# Patient Record
Sex: FEMALE | HISPANIC OR LATINO | Employment: FULL TIME | URBAN - METROPOLITAN AREA
[De-identification: names, ages, dates, MRNs, and addresses within clinical notes are randomized per-mention and may not be internally consistent; named-entity substitution may affect disease eponyms.]

---

## 2020-09-07 ENCOUNTER — APPOINTMENT (OUTPATIENT)
Dept: CT IMAGING | Facility: CLINIC | Age: 45
End: 2020-09-07
Attending: EMERGENCY MEDICINE

## 2020-09-07 ENCOUNTER — APPOINTMENT (OUTPATIENT)
Dept: ULTRASOUND IMAGING | Facility: CLINIC | Age: 45
End: 2020-09-07
Attending: EMERGENCY MEDICINE

## 2020-09-07 ENCOUNTER — HOSPITAL ENCOUNTER (EMERGENCY)
Facility: CLINIC | Age: 45
Discharge: HOME | End: 2020-09-07
Attending: EMERGENCY MEDICINE | Admitting: EMERGENCY MEDICINE

## 2020-09-07 VITALS
OXYGEN SATURATION: 98 % | SYSTOLIC BLOOD PRESSURE: 126 MMHG | HEART RATE: 78 BPM | TEMPERATURE: 98.3 F | RESPIRATION RATE: 16 BRPM | DIASTOLIC BLOOD PRESSURE: 97 MMHG | WEIGHT: 140 LBS

## 2020-09-07 DIAGNOSIS — D25.2 SUBSEROUS LEIOMYOMA OF UTERUS: ICD-10-CM

## 2020-09-07 DIAGNOSIS — R55 SYNCOPE, UNSPECIFIED SYNCOPE TYPE: ICD-10-CM

## 2020-09-07 DIAGNOSIS — R56.9 SEIZURE-LIKE ACTIVITY (H): ICD-10-CM

## 2020-09-07 LAB
ANION GAP SERPL CALCULATED.3IONS-SCNC: 6 MMOL/L (ref 3–14)
B-HCG FREE SERPL-ACNC: <5 IU/L
B-HCG SERPL-ACNC: <1 IU/L (ref 0–5)
BASOPHILS # BLD AUTO: 0 10E9/L (ref 0–0.2)
BASOPHILS NFR BLD AUTO: 0.6 %
BUN SERPL-MCNC: 15 MG/DL (ref 7–30)
CALCIUM SERPL-MCNC: 9.2 MG/DL (ref 8.5–10.1)
CHLORIDE SERPL-SCNC: 104 MMOL/L (ref 94–109)
CO2 SERPL-SCNC: 26 MMOL/L (ref 20–32)
CREAT SERPL-MCNC: 0.67 MG/DL (ref 0.52–1.04)
D DIMER PPP FEU-MCNC: 0.5 UG/ML FEU (ref 0–0.5)
DIFFERENTIAL METHOD BLD: NORMAL
EOSINOPHIL # BLD AUTO: 0.1 10E9/L (ref 0–0.7)
EOSINOPHIL NFR BLD AUTO: 1.6 %
ERYTHROCYTE [DISTWIDTH] IN BLOOD BY AUTOMATED COUNT: 12.9 % (ref 10–15)
GFR SERPL CREATININE-BSD FRML MDRD: >90 ML/MIN/{1.73_M2}
GLUCOSE SERPL-MCNC: 114 MG/DL (ref 70–99)
HCT VFR BLD AUTO: 44.6 % (ref 35–47)
HGB BLD-MCNC: 14.1 G/DL (ref 11.7–15.7)
IMM GRANULOCYTES # BLD: 0 10E9/L (ref 0–0.4)
IMM GRANULOCYTES NFR BLD: 0.4 %
LYMPHOCYTES # BLD AUTO: 1.3 10E9/L (ref 0.8–5.3)
LYMPHOCYTES NFR BLD AUTO: 26.7 %
MCH RBC QN AUTO: 29.1 PG (ref 26.5–33)
MCHC RBC AUTO-ENTMCNC: 31.6 G/DL (ref 31.5–36.5)
MCV RBC AUTO: 92 FL (ref 78–100)
MONOCYTES # BLD AUTO: 0.4 10E9/L (ref 0–1.3)
MONOCYTES NFR BLD AUTO: 7.6 %
NEUTROPHILS # BLD AUTO: 3.1 10E9/L (ref 1.6–8.3)
NEUTROPHILS NFR BLD AUTO: 63.1 %
NRBC # BLD AUTO: 0 10*3/UL
NRBC BLD AUTO-RTO: 0 /100
PLATELET # BLD AUTO: 246 10E9/L (ref 150–450)
POTASSIUM SERPL-SCNC: 3.6 MMOL/L (ref 3.4–5.3)
RBC # BLD AUTO: 4.85 10E12/L (ref 3.8–5.2)
SODIUM SERPL-SCNC: 136 MMOL/L (ref 133–144)
TROPONIN I SERPL-MCNC: <0.015 UG/L (ref 0–0.04)
WBC # BLD AUTO: 4.9 10E9/L (ref 4–11)

## 2020-09-07 PROCEDURE — 25000132 ZZH RX MED GY IP 250 OP 250 PS 637: Performed by: EMERGENCY MEDICINE

## 2020-09-07 PROCEDURE — 85025 COMPLETE CBC W/AUTO DIFF WBC: CPT | Performed by: EMERGENCY MEDICINE

## 2020-09-07 PROCEDURE — 71275 CT ANGIOGRAPHY CHEST: CPT

## 2020-09-07 PROCEDURE — 84702 CHORIONIC GONADOTROPIN TEST: CPT

## 2020-09-07 PROCEDURE — 80048 BASIC METABOLIC PNL TOTAL CA: CPT | Performed by: EMERGENCY MEDICINE

## 2020-09-07 PROCEDURE — 99285 EMERGENCY DEPT VISIT HI MDM: CPT | Mod: 25

## 2020-09-07 PROCEDURE — 70450 CT HEAD/BRAIN W/O DYE: CPT

## 2020-09-07 PROCEDURE — 76856 US EXAM PELVIC COMPLETE: CPT

## 2020-09-07 PROCEDURE — 84702 CHORIONIC GONADOTROPIN TEST: CPT | Performed by: EMERGENCY MEDICINE

## 2020-09-07 PROCEDURE — 85379 FIBRIN DEGRADATION QUANT: CPT | Performed by: EMERGENCY MEDICINE

## 2020-09-07 PROCEDURE — 25000128 H RX IP 250 OP 636: Performed by: EMERGENCY MEDICINE

## 2020-09-07 PROCEDURE — 84484 ASSAY OF TROPONIN QUANT: CPT | Performed by: EMERGENCY MEDICINE

## 2020-09-07 PROCEDURE — 25800030 ZZH RX IP 258 OP 636: Performed by: EMERGENCY MEDICINE

## 2020-09-07 PROCEDURE — 93005 ELECTROCARDIOGRAM TRACING: CPT

## 2020-09-07 PROCEDURE — 96360 HYDRATION IV INFUSION INIT: CPT

## 2020-09-07 RX ORDER — IOPAMIDOL 755 MG/ML
500 INJECTION, SOLUTION INTRAVASCULAR ONCE
Status: COMPLETED | OUTPATIENT
Start: 2020-09-07 | End: 2020-09-07

## 2020-09-07 RX ORDER — ACETAMINOPHEN 500 MG
1000 TABLET ORAL ONCE
Status: COMPLETED | OUTPATIENT
Start: 2020-09-07 | End: 2020-09-07

## 2020-09-07 RX ORDER — IBUPROFEN 600 MG/1
600 TABLET, FILM COATED ORAL ONCE
Status: COMPLETED | OUTPATIENT
Start: 2020-09-07 | End: 2020-09-07

## 2020-09-07 RX ADMIN — SODIUM CHLORIDE 75 ML: 9 INJECTION, SOLUTION INTRAVENOUS at 14:29

## 2020-09-07 RX ADMIN — IOPAMIDOL 54 ML: 755 INJECTION, SOLUTION INTRAVENOUS at 14:29

## 2020-09-07 RX ADMIN — ACETAMINOPHEN 1000 MG: 500 TABLET, FILM COATED ORAL at 13:20

## 2020-09-07 RX ADMIN — IBUPROFEN 600 MG: 600 TABLET, FILM COATED ORAL at 15:13

## 2020-09-07 ASSESSMENT — ENCOUNTER SYMPTOMS
FATIGUE: 1
FEVER: 0
SLEEP DISTURBANCE: 1
DIZZINESS: 0
SHORTNESS OF BREATH: 1
HEADACHES: 1
BACK PAIN: 0
TREMORS: 1
ABDOMINAL PAIN: 0
NECK PAIN: 0

## 2020-09-07 NOTE — ED AVS SNAPSHOT
[facility] Emergency Department  [address]  [facility] [address]  Phone:  [phone]  Fax:  [phone]                                    Cleopatra Choi   MRN: [id number]    Department:  [facility] Emergency Department   Date of Visit:  9/7/2020           After Visit Summary Signature Page    I have received my discharge instructions, and my questions have been answered. I have discussed any challenges I see with this plan with the nurse or doctor.    ..........................................................................................................................................  Patient/Patient Representative Signature      ..........................................................................................................................................  Patient Representative Print Name and Relationship to Patient    ..................................................               ................................................  Date                                   Time    ..........................................................................................................................................  Reviewed by Signature/Title    ...................................................              ..............................................  Date                                               Time          22EPIC Rev 08/18

## 2020-09-07 NOTE — DISCHARGE INSTRUCTIONS
Please monitor your symptoms closely.  Please follow-up with your primary care provider in 2 to 3 days for reevaluation.  I would also like you to follow-up with neurology.  Please contact the clinic tomorrow morning.  We have also faxed records over to them from the ER to assist with follow-up.    Please do not drive or be alone your spending bodies of water until you are cleared to do so by neurology.    Discharge Instructions  Syncope    Syncope (fainting) is a sudden, short loss of consciousness (passing out spell). People will usually fall to the ground when they faint or slump over if seated.  People may also shake when this happens, and it can sometimes be difficult to tell the difference between syncope and a seizure. At this time, your provider does not find a reason to suspect that your fainting spell is a sign of anything dangerous or life-threatening.  However, sometimes the signs of serious illness do not show up right away.     Generally, every Emergency Department visit should have a follow-up clinic visit with either a primary or a specialty clinic/provider. Please follow-up as instructed by your emergency provider today.    Return to the Emergency Department if:  You faint again.   You have any significant bleeding.  You have chest pain or a fast or irregular heartbeat.  You feel short of breath.  You cough up any blood.  You have abdominal (belly) pain or unusual back pain.  You have ongoing vomiting (throwing up) or diarrhea (loose stools).  You have a black or tarry bowel movement, or blood in the stool or in your vomit.  You have a fever over 101 F.  You lose feeling or cannot move a part of your body or cannot talk normally.  You are confused, have a headache, cannot see well, or have a seizure.  DO NOT DRIVE. CALL 911 INSTEAD!    What can I do to help myself?  Follow any specific instructions that your provider discussed with you.  If you feel light-headed, make sure to sit down right away,  even if you have to sit on the floor.  Follow up with your regular medical provider as discussed for further management. This may include lowering your blood pressure medications, insulin or other diabetic medications, checking your blood sugar more frequently, and drinking more fluids, taking medicines for vomiting or diarrhea or getting up slower.  If you were given a prescription for medicine here today, be sure to read all of the information (including the package insert) that comes with your prescription.  This will include important information about the medicine, its side effects, and any warnings that you need to know about.  The pharmacist who fills the prescription can provide more information and answer questions you may have about the medicine.  If you have questions or concerns that the pharmacist cannot address, please call or return to the Emergency Department.   Remember that you can always come back to the Emergency Department if you are not able to see your regular provider in the amount of time listed above, if you get any new symptoms, or if there is anything that worries you.

## 2020-09-07 NOTE — ED PROVIDER NOTES
"  History   Chief Complaint:  Loss of Consciousness    HPI  Cleopatra Choi is a 45 year old female who presents via EMS after losing consciousness and sustaining a head injury. Last night, the patient reports having a sleepless night. When she does not sleep well, she reports it is common for her to have a headache upon waking. This morning was no different, and she reports her headache was her typical headache. She denies any other symptoms this morning, other than fatigue. Upon her arrival at work, around 0645, she took a dose of OTC analgesia for the headache with improvement and continued about her typical work day until 1030. At that time, she was sitting at her desk, in a high chair, when she suddenly became short of breath and her headache slightly worsened before losing consciousness; this caused her to fall off the chair and hit her forehead on the floor. One of her coworkers, a nurse, witnessed the event and reported the patient was \"shaking\" for about 30 seconds before regaining consciousness. Upon awakening, however, the patient reports she was alert and oriented, and remembers waking up on the floor. She did not lose control of her bowel or bladder, but does report a laceration to her upper lip. Given the syncopal event, her coworker called 911. The paramedics placed her in a C-collar and brought her to the ED. The patient denies any prodromal chest pain, palpitations, unilateral weakness/numbness, dizziness, or severe abdominal pain. Currently, she reports a mild headache. She denies any dental pain or jaw malocclusion. She has no personal history of syncope or seizures, however she reports her father was diagnosed with epilepsy at age 50. She also denies any personal or family history of blood clots, and she is not currently on hormone therapy. She does report a long car trip, from [place] to [place], about two weeks ago. Around that time, she reports her legs seemed more swollen, however this was " "equal bilaterally. She denies any current calf pain or swelling.     Additionally, the patient reports concern for a possible miscarriage. She states her last menstrual period was 8/16 however in the weeks after that she started to \"feel pregnant\" however she did not take a pregnancy test. This week, she started noticing vaginal spotting which feels similar to previous miscarriages. Currently, she reports the bleeding is still present, but is not brisk and she does not need to wear a pad/tampon. She also denies any abdominal pain currently. In addition to miscarriages, she reports a history of two ectopic pregnancies both of which were treated with medication, not surgical intervention. In total, she has had approximately 5 pregnancies and only one child.     Allergies:  No Known Allergies    Medications:    The patient is currently on no regular medications.     Past Medical History:    Ectopic pregnancy   Miscarriage    Past Surgical History:    Abdominoplasty     Family History:    MI - Father, first at 50   Epilepsy - Father     Social History:  Marital Status: .  Presents with EMS. , Wilber, at bedside.   Works at [employer].   Event occurred at work.     Review of Systems   Constitutional: Positive for fatigue. Negative for fever.   Respiratory: Positive for shortness of breath (pre-syncope).    Cardiovascular: Negative for chest pain.   Gastrointestinal: Negative for abdominal pain.   Genitourinary: Positive for vaginal bleeding.   Musculoskeletal: Negative for back pain and neck pain.   Neurological: Positive for tremors, syncope and headaches. Negative for dizziness.   Psychiatric/Behavioral: Positive for sleep disturbance.   All other systems reviewed and are negative.    Physical Exam     Patient Vitals for the past 24 hrs:   BP Temp Temp src Pulse Resp SpO2 Weight   09/07/20 1325 -- -- -- -- -- -- 63.5 kg (140 lb)   09/07/20 1230 127/77 -- -- 77 -- 98 % --   09/07/20 1215 129/73 -- -- " 74 -- 100 % --   09/07/20 1200 130/83 -- -- 80 -- 98 % --   09/07/20 1145 -- -- -- 82 -- 100 % --   09/07/20 1130 -- -- -- 80 -- 97 % --   09/07/20 1117 (!) 139/94 98.3  F (36.8  C) Oral 86 16 98 % --        Physical Exam  General:              Well-nourished              Speaking in full sentences              GCS 15              Awake, alert  Head:              Hematoma to forehead              Remainder of scalp without hematoma or step-off              No bleeding  Eyes:              Conjunctiva without injection or scleral icterus              No raccoon eyes  ENT:              Moist mucous membranes              Nares patent              Pinnae normal  Neck:              Full ROM              No stiffness appreciated              No midline tenderness              No midline tenderness through lateral rotation, flexion/extension and axial loading  Resp:              Lungs CTAB              No crackles, wheezing or audible rubs              Good air movement  CV:                    Normal rate, regular rhythm              S1 and S2 present              No murmur, gallop or rub  GI:              BS present              Abdomen soft without distention              Non-tender to light and deep palpation              No guarding or rebound tenderness  Skin:              Warm, dry, well perfused              No rashes or open wounds on exposed skin  MSK:              Moves all extremities              No focal deformities or swelling  Neuro:              Alert              Answers questions appropriately              Moves all extremities equally              Gait stable  Psych:              Normal affect, normal mood    Emergency Department Course     ECG:  Indication: Syncope  Time: 1137  Vent. Rate 79 bpm. MT interval 168. QRS duration 98. QT/QTc 356/408. P-R-T axis 52 -1 33.    Normal sinus rhythm  Negative for WPW and Brugada.   No prolonged QT.   Read time: 1206.    Imaging:  Radiologic findings were  communicated with the patient who voiced understanding of the findings.  CT Chest w/ IV contrast:   1.  No evidence of pulmonary embolism. Thoracic aorta is unremarkable.   2.  Lungs are clear. No enlarged lymph nodes, as per radiology.    CT Head without contrast:   1. No acute intracranial abnormality.   2. Frontal scalp contusion, as per radiology.     US Pelvic, Complete w Transvaginal:  1.  Left fundal fibroid appears submucosal. No endometrial thickening. Ovaries are unremarkable. No ultrasound findings to suggest ovarian torsion.   2.  Elongated structure right adnexa probably a loop of bowel, as per radiology.     Laboratory:  Laboratory findings were communicated with the patient who voiced understanding of the findings.  CBC: WBC: 4.9, HGB: 14.1, PLT: 246  BMP: Glucose 114 (H), o/w WNL (Creatinine: 0.67)    1147 Troponin: <0.015   D dimer: 0.5    ISTAT hCG: <5.0    HCG quant: <1    Interventions:  1320 Tylenol, 1000 mg, PO    Emergency Department Course:  Nursing notes and vitals reviewed.   1207: I performed an exam of the patient as documented above.      EKG obtained in the ED, see results above.    IV was inserted and blood was drawn for laboratory testing, results above. Medicine administered as documented above.     1309: Per RN, the patient is reported a worsening headache.    1312: I rechecked the patient and discussed the results of her workup thus far.     The patient was sent for head and chest CTs, as well as a pelvic ultrasound, while in the emergency department, results above.      1502: I rechecked the patient and discussed the rest of her work up results, as well as discharge instructions and importance of neurology follow-up.     Findings and plan explained to the Patient and spouse. Patient discharged home with instructions regarding supportive care, medications, and reasons to return. The importance of close follow-up was reviewed. She was referred to neurology.    I personally reviewed  the laboratory and imaging results with the Patient and answered all related questions prior to discharge.    Impression & Plan      Medical Decision Making:   Cleopatra Choi is a 45 year old female who presents with loss of consciousness.   VS on presentation reveal elevated BP, which improved during her emergency department course.  Differential diagnosis includes, though is not limited to, cardiac causes (arrhythmia, acute coronary syndrome, aortic dissection, aortic stenosis, carotid sinus sensitivity) pulmonary embolism, CVA/TIA, seizure, subarachnoid hemorrhage, intracranial hemorrhage, orthostatic hypotension, hypoglycemia, toxicologic etiologies, and infection.  Presently, the precise etiology for patient's episode prior to presentation is unclear.  Cardiac causes were considered.  EKG demonstrates sinus rhythm without evidence of acute arrhythmia.  She was observed in the ED without any recurrent episodes, and continue to deny the feelings of palpitations or chest pain.  She has no history of underlying cardiac arrhythmia, congestive heart failure, or other known cardiac history.  Her EKG does not reveal prolonged QT interval, signs of WPW/preexcitation, or Brugada syndrome.  Pulmonary embolism considered given patient's recent long travel to [place].  D-dimer did return at the upper limits of normal.  Given her feeling of shortness of breath prior to this, using shared decision-making, we elected to proceed with a CT PE study.  Fortunately, this reveals no evidence of acute pulmonary embolism.  On reassessment, the patient is without ongoing shortness of breath.  In light of her fall and hematoma to the forehead, noncontrast head CT of the head also obtained, which fortunately reveals no evidence of acute intracranial hemorrhage.  As this was obtained less than 6 hours since the onset of her symptoms, and there is no evidence of intracranial hemorrhage, doubt subarachnoid hemorrhage requiring further  evaluation with LP.  Patient's reported headache earlier today also consistent with her typical and chronic headaches and not sudden onset, thunderclap in nature.  Her neurologic exam is nonfocal, and she exhibits symmetric strength in upper and lower extremities, arguing against acute CVA.  Syncope would also be unlikely presentation for this.  With regards to her fall, remainder of skeletal survey reveals no other traumatic injuries warranting additional imaging.  She was able to be clinically cleared from the cervical collar at bedside, given normal mentation, absence of midline tenderness, full range of motion about her neck, and normal neurologic exam.  Patient also presents in the context of 1 week of vaginal spotting.  She expresses concern for possible miscarriage.  Her pregnancy test currently is negative.  Pelvic ultrasound as noted above, demonstrates a subserosal fibroid, which patient had previously been aware of.  Labs are without evidence of anemia to account for patient's syncopal episode.  Electrolytes otherwise unremarkable.  Seizure also on the differential.  There is a positive family history of epilepsy in her father, diagnosed at the age of 50.  Patient was noted to have tonic-clonic movements per bystanders, though also has atypical features and that this was not accompanied by a postictal phase, incontinence, or tongue biting.  She does have a small laceration to her upper lip, though I suspect this was more related to her fall as opposed to biting down on her tongue.  Results and clinical impression were discussed with the patient and her .  She remains clinically well-appearing, and hemodynamically stable during her entire emergency department course.  With reasonable clinical certainty I feel she is appropriate for discharge from the ED with close outpatient follow-up.  S seizure remains on the differential, I have recommended follow-up with neurology as an outpatient.  Referral  information was provided to the patient as well as a form filled out in the ED to assist with close follow-up.  She was informed of the need to not drive, or be alone near standing bodies of water until cleared to do so by neurology.  I have additionally recommended follow-up with a primary care provider in 2 to 3 days for reevaluation.  Referral information was additionally given to the patient.  She is certainly welcome to return to the ED should she develop any recurrent fainting episodes, recurrent seizure-like activity, or any other new or troubling symptoms.  All of her questions were answered prior to discharge.      Diagnosis:     ICD-10-CM    1. Syncope, unspecified syncope type  R55    2. Seizure-like activity (H)  R56.9    3. Subserous leiomyoma of uterus  D25.2        Disposition:   Discharged to home.    Scribe Disclosure:  I, Margaret Blane, am serving as a scribe on 9/7/2020 at 12:07 PM to personally document services performed by Josef Vital MD based on my observations and the provider's statements to me.       EMERGENCY DEPARTMENT     Josef Vital MD  09/08/20 [id number]

## 2020-09-07 NOTE — ED NOTES
Bed: ED29  Expected date: 9/7/20  Expected time: 10:59 AM  Means of arrival: Ambulance  Comments:  [id number]

## 2020-09-07 NOTE — ED NOTES
"Patient presents via EMS with loss of consciousness. Patient was at [employer] working, sitting on a high chair, when she fell forward, lost consciousness, and hit her head on the floor. She then had an episode of \"shaking\" for about 30 seconds. C-collar in place. Of note, she thinks she might have had a miscarriage this week, never took a pregnancy test, but does feel like she was pregnant. Having pink-tinged vaginal bleeding. ABCDs intact, alert and oriented x 4.   "

## 2020-09-08 LAB — INTERPRETATION ECG - MUSE: NORMAL

## 2021-08-09 ENCOUNTER — NURSE TRIAGE (OUTPATIENT)
Dept: NURSING | Facility: CLINIC | Age: 46
End: 2021-08-09

## 2021-08-10 NOTE — PROGRESS NOTES
"Pre-Visit Planning   Next 5 appointments (look out 90 days)    Aug 11, 2021  4:30 PM  (Arrive by 4:20 PM)  Office Visit with Jay Venegas DO  [facility] ([facility] ) [address] [facility] [address] [phone]        Appointment Notes for this encounter:   sleeping issues / migraine issues     Questionnaires Reviewed/Assigned  No additional questionnaires are needed    Patient preferred phone number: [phone]      Spoke to patient via phone. Patient does not have additional questions or concerns.        Visit is not preventive.    Health Maintenance Due   Topic Date Due     PREVENTIVE CARE VISIT  Never done     ANNUAL REVIEW OF HM ORDERS  Never done     ADVANCE CARE PLANNING  Never done     MAMMO SCREENING  Never done     HIV SCREENING  Never done     HEPATITIS C SCREENING  Never done     PAP  Never done     DTAP/TDAP/TD IMMUNIZATION (1 - Tdap) Never done     PHQ-2  Never done     LIPID  Never done     Patient is due for:  preventive care visit  No appointment needed.    [employer]  Patient is not active on [employer]. Encouraged [employer] activation.    Questionnaire Review   Advised patient to arrive early in order to complete questionnaires.    Call Summary  \"Thank you for your time today.  If anything comes up before your appointment, please feel free to contact us at [phone].\"       Loni Garay/      "

## 2021-08-11 ENCOUNTER — OFFICE VISIT (OUTPATIENT)
Dept: FAMILY MEDICINE | Facility: CLINIC | Age: 46
End: 2021-08-11

## 2021-08-11 VITALS
WEIGHT: 146 LBS | HEART RATE: 98 BPM | BODY MASS INDEX: 26.87 KG/M2 | TEMPERATURE: 98 F | RESPIRATION RATE: 16 BRPM | HEIGHT: 62 IN | SYSTOLIC BLOOD PRESSURE: 116 MMHG | DIASTOLIC BLOOD PRESSURE: 70 MMHG | OXYGEN SATURATION: 97 %

## 2021-08-11 DIAGNOSIS — R73.9 ELEVATED SERUM GLUCOSE: ICD-10-CM

## 2021-08-11 DIAGNOSIS — A04.8 H. PYLORI INFECTION: ICD-10-CM

## 2021-08-11 DIAGNOSIS — R74.01 ELEVATED ALT MEASUREMENT: ICD-10-CM

## 2021-08-11 DIAGNOSIS — R73.03 PREDIABETES: ICD-10-CM

## 2021-08-11 DIAGNOSIS — R10.13 EPIGASTRIC PAIN: ICD-10-CM

## 2021-08-11 DIAGNOSIS — R07.9 CHEST PAIN, UNSPECIFIED TYPE: Primary | ICD-10-CM

## 2021-08-11 DIAGNOSIS — R00.0 TACHYCARDIA: ICD-10-CM

## 2021-08-11 DIAGNOSIS — G47.00 INSOMNIA, UNSPECIFIED TYPE: ICD-10-CM

## 2021-08-11 LAB
ERYTHROCYTE [DISTWIDTH] IN BLOOD BY AUTOMATED COUNT: 13.1 % (ref 10–15)
HCT VFR BLD AUTO: 39.6 % (ref 35–47)
HGB BLD-MCNC: 13.4 G/DL (ref 11.7–15.7)
MCH RBC QN AUTO: 29.5 PG (ref 26.5–33)
MCHC RBC AUTO-ENTMCNC: 33.8 G/DL (ref 31.5–36.5)
MCV RBC AUTO: 87 FL (ref 78–100)
PLATELET # BLD AUTO: 216 10E3/UL (ref 150–450)
RBC # BLD AUTO: 4.55 10E6/UL (ref 3.8–5.2)
WBC # BLD AUTO: 6.7 10E3/UL (ref 4–11)

## 2021-08-11 PROCEDURE — 99203 OFFICE O/P NEW LOW 30 MIN: CPT | Performed by: FAMILY MEDICINE

## 2021-08-11 PROCEDURE — 36415 COLL VENOUS BLD VENIPUNCTURE: CPT | Performed by: FAMILY MEDICINE

## 2021-08-11 PROCEDURE — 80053 COMPREHEN METABOLIC PANEL: CPT | Performed by: FAMILY MEDICINE

## 2021-08-11 PROCEDURE — 83690 ASSAY OF LIPASE: CPT | Performed by: FAMILY MEDICINE

## 2021-08-11 PROCEDURE — 93000 ELECTROCARDIOGRAM COMPLETE: CPT | Performed by: FAMILY MEDICINE

## 2021-08-11 PROCEDURE — 86803 HEPATITIS C AB TEST: CPT | Performed by: FAMILY MEDICINE

## 2021-08-11 PROCEDURE — 85027 COMPLETE CBC AUTOMATED: CPT | Performed by: FAMILY MEDICINE

## 2021-08-11 PROCEDURE — 84443 ASSAY THYROID STIM HORMONE: CPT | Performed by: FAMILY MEDICINE

## 2021-08-11 RX ORDER — HYDROXYZINE PAMOATE 50 MG/1
50 CAPSULE ORAL
Qty: 30 CAPSULE | Refills: 0 | Status: SHIPPED | OUTPATIENT
Start: 2021-08-11 | End: 2021-10-27

## 2021-08-11 RX ORDER — NAPROXEN 500 MG/1
500 TABLET ORAL
COMMUNITY
Start: 2019-10-18

## 2021-08-11 ASSESSMENT — PATIENT HEALTH QUESTIONNAIRE - PHQ9: SUM OF ALL RESPONSES TO PHQ QUESTIONS 1-9: 10

## 2021-08-11 ASSESSMENT — MIFFLIN-ST. JEOR: SCORE: 1252.56

## 2021-08-11 NOTE — PATIENT INSTRUCTIONS
Try to use acetaminophen in place of naproxen for pain relief, as this can upset stomach. I will review you studies via [employer]. It would be great to do a preventive health exam soon.   Patient Education     What Is Insomnia?    Do you have trouble falling asleep? Do you wake up often during the night? Or maybe you wake up too early in the morning. You may be suffering from insomnia. Talk with your healthcare provider if it lasts longer than a few weeks and you feel tired most of the time.   What causes insomnia?  Some common causes of insomnia are:    Health problems. These may be things such as pain, depression, medicine side effects, or trouble breathing.    Circadian rhythm disorder. This is a shift in the body s normal 24-hour activity cycle.    Lifestyle factors. These can include a changing sleep schedule, lack of exercise, or too much caffeine, nicotine, or alcohol.    Sleep settings. This includes things such as a poor mattress, noise, or a room that s too hot or too cold.    Stress. You may be stressed about problems at work, money worries, or family events.  Talk with your healthcare provider  Describe your sleeping problems to your healthcare provider. Try to keep a daily sleep diary for a couple of weeks. Write down the time you go to bed, the time you wake up, and anything that seems to affect your sleep. Your healthcare provider can work with you to create a treatment plan. You may need to learn good sleeping habits and make some lifestyle changes. If you have any health problems, these may need to be treated first.   ULURU last reviewed this educational content on 9/1/2019 2000-2021 [employer]. All rights reserved. This information is not intended as a substitute for professional medical care. Always follow your healthcare professional's instructions.           Patient Education     Treating Insomnia     Learning to relax before bedtime can improve your sleep.   Good sleeping  habits are a key part of treatment. If needed, some medicines may help you sleep better at first. Making healthy lifestyle changes and learning to relax can improve your sleep. Treating insomnia takes commitment. But trust that your efforts will pay off. Be sure to talk with your healthcare provider before taking any medicine.  Healthy lifestyle  Your lifestyle affects your health and your sleep. Here are some healthy habits:    Keep a regular sleep schedule. Go to bed and get up at the same time each day.    Exercise regularly. It may help you reduce stress. Avoid strenuous exercise for 2 to 4 hours before bedtime.    Avoid or limit naps, especially in the late afternoon.    Use your bed only for sleep and sex.    Don t spend too much time in bed trying to fall asleep. If you can t fall asleep, get up and do something (no electronics) until you become tired and drowsy.    Avoid or limit caffeine and nicotine for up to 6 hours before bedtime. They can keep you awake at night.     Also avoid alcohol for at least 4 to 6 hours before bedtime. It may help you fall asleep at first. But you will have more awakenings during the night. And your sleep will not be restful.  Before bedtime  To sleep better every night, try these tips:    Have a bedtime routine to let your body and mind know when it s time to sleep.    Think of going to bed as relaxing and enjoyable. Sleep will come sooner.    If your worries don t let you sleep, write them down in a diary. Then close it, and go to bed.    Make sure the room is not too hot or too cold. If it s not dark enough, an eye mask can help. If it s noisy, try using earplugs.    Don't eat a large meal just before bedtime.    Remove noises, bright lights, TVs, cell phones, and computers from your sleeping environment.    Use a comfortable mattress and pillow.  Learn to relax  Stress, anxiety, and body tension may keep you awake at night. To unwind before bedtime, try a warm bath,  meditation, or yoga. Also try the following:    Deep breathing. Sit or lie back in a chair. Take a slow, deep breath. Hold it for 5 counts. Then breathe out slowly through your mouth. Keep doing this until you feel relaxed.    Progressive muscle relaxation. Tense and then relax the muscles in your body as you breathe deeply. Start with your feet and work up your body to your neck and face.  Cognitive Behavioral Therapy (CBT)  CBT is the most effective treatment for long-term insomnia. It tries to address the underlying causes of your sleep problems, including your habits and how you think about sleep.  Individual Therapy  Robbie Jules PsyD, ELIE  Insomnia   [facility]    [facility]: [phone]    [facility]: [phone]  [facility]  Visit [email or web address] or call [phone] to find a clinic close to you.  Suggested resources  The resources below may help you relax. This list is for information only. [facility] is not responsible for the quality of services or the actions of any person or organization. There may be a fee to use some of these resources.  Insomnia treatment books  Natasha Mind by Nguyen Randhawa and Tori Kendrick (2013)  Overcoming Insomnia by Iam Cross and Nguyen Randhawa (2008)  Quiet Your Mind and Get to Sleep: Solutions to Insomnia for Those with Depression, Anxiety or Chronic Pain by Nguyen Randhawa and Tori Kendrick (2009)  No More Sleepless Nights by Félix Mcarthur and Catarina Mcarthur (1996)  Say Natasha to Insomnia by Brad Monaco (2009)  The Insomnia Workbook by Marilyn Baxter and Klaus Arshad (2009)  The Insomnia Answer by Jay Juarez and Mateus Richter (2006)  Stress management and relaxation books  The Relaxation and Stress Reduction Workbook by Rosa Allison, Allyson Fraire and Morris Mendoza (2008)  Stress Management Workbook: Techniques and Self-Assessment  Procedures by Kenya Ramos and Edenilson Monte (1997)  A Mindfulness-Based Stress Reduction Workbook by Sanjiv Hudson and Vandana Begum (2010)  The Complete Stress Management Workbook by Chung De Souza, Lazaro Matias and Eliel Morillo (1996)  Online programs  www.SHUTi.me (pronounced shut eye). There is a fee for this program.   www.sleepIO.com (pronounced sleep ee oh). There is a fee for this program.  Other online resources  Deep breathing and progressive muscle relaxation (PMR):    http://www.[employer]  Meditation:    www.fragrantheart.creads    www.[facility] MediaguidedImagiin.meditationImagiin.site.creads  Guided imagery:    http://www.[employer]    http://Shippo.creads  (click on  Resource Library,  then choose  Meditation, Relaxation, Guided Imagery )  Apps for your mobile device:    Autogenic Training Progressive Muscle Relaxation by [employer]    Calm: Meditation and Sleep Stories by [employer]    Mortar Data Timer - Meditation Evelin by [employer].  This is not a prescription and these resources are optional. You must pay for any costs when using these resources. Please ask your insurance carrier if you can be reimbursed for these resources. If so, you are responsible for sending the needed details to your insurance carrier. These resources may also be tax deductible as medical expenses. Check with your .  Date Last Reviewed: 5/18/2018  Clinically reviewed by Robbie Jules PsyD, ELIE, [facility], Director of the Insomnia and Behavioral Sleep Health Program, [facility].  For informational purposes only. Not to replace the advice of your health care provider.  Copyright   2018 [facility]. All rights reserved.           Patient Education     Uncertain Causes of Chest Pain    Chest pain can happen for a number of reasons. Sometimes the cause can't be determined. If your condition does not seem serious, and your pain does not appear to be coming from your heart,  your healthcare provider may recommend watching it closely. Sometimes the signs of a serious problem take more time to appear. Many problems not related to your heart can cause chest pain. These include:    Musculoskeletal. Costochondritis is an inflammation of the tissues around the ribs that can occur from trauma or overuse injuries, or a strain of the muscles of the chest wall    Respiratory. Pneumonia, collapsed lung (pneumothorax), or inflammation of the lining of the chest and lungs (pleurisy)    Gastrointestinal. Esophageal reflux, heartburn, ulcers, or gallbladder disease    Anxiety and panic disorders    Nerve compression and inflammation    Rare miscellaneous problems such as aortic aneurysm (a swelling of the large artery coming out of the heart) or pulmonary embolism (a blood clot in the lungs)  Home care  After your visit, follow these recommendations:    Rest today and avoid strenuous activity.    Take any prescribed medicine as directed.    Be aware of any recurrent chest pain and notice any changes  Follow-up care  Follow up with your healthcare provider if you do not start to feel better within 24 hours, or as advised.  Call 911  Call 911 if any of these occur:    A change in the type of pain: if it feels different, becomes more severe, lasts longer, or begins to spread into your shoulder, arm, neck, jaw or back    Shortness of breath or increased pain with breathing    Weakness, dizziness, or fainting    Rapid heart beat    Crushing sensation in your chest  When to seek medical advice  Call your healthcare provider right away if any of the following occur:    Cough with dark colored sputum (phlegm) or blood    Fever of 100.4 F (38 C) or higher, or as directed by your healthcare provider    Swelling, pain or redness in one leg  Naked last reviewed this educational content on 5/1/2018 2000-2021 [employer]. All rights reserved. This information is not intended as a substitute for  professional medical care. Always follow your healthcare professional's instructions.           Patient Education     Lifestyle Changes for Controlling GERD  When you have GERD, stomach acid feels as if it s backing up toward your mouth. Making lifestyle changes can often improve your symptoms. This is true if you take medicine to control your GERD or not. Talk with your healthcare provider about the following suggestions. They may help you get relief from your symptoms.  Raise your head    Reflux is more likely to happen when you re lying down flat. That's because stomach fluid can flow backward more easily. Raising the head of your bed 4 to 6 inches can help. To do this:    Slide blocks or books under the legs at the head of your bed. Or put a wedge under the mattress. Many [employer] stores can make a wedge for you. The wedge should go from your waist to the top of your head.    Don t just prop your head up on a few pillows. This increases pressure on your stomach. It can make GERD worse.  Watch your eating habits  Certain foods may increase the acid in your stomach. Or they may relax the lower esophageal sphincter. This makes GERD more likely. It s best to avoid the following if they cause you symptoms:    Coffee, tea, and carbonated drinks (with and without caffeine)    Fatty, fried, or spicy food    Mint, chocolate, onions, tomatoes, and citrus    Peppermint    Any other foods that seem to irritate your stomach or cause you pain  Relieve the pressure  Tips include the following:    Eat smaller meals, even if you have to eat more often.    Don t lie down right after you eat. Wait a few hours for your stomach to empty.    Don't wear tight belts or tight-fitting clothes.    Lose any extra weight.  Tobacco and alcohol  Don't smoke tobacco or drink alcohol. They can make GERD symptoms worse.  Vicino last reviewed this educational content on 6/1/2019 2000-2021 [employer]. All rights reserved. This  information is not intended as a substitute for professional medical care. Always follow your healthcare professional's instructions.           Patient Education     Prediabetes  You have been diagnosed with prediabetes. This means that the level of sugar (glucose) in your blood is too high. If you have prediabetes, you are at risk for developing type 2 diabetes. Type 2 diabetes is diagnosed when the level of glucose in the blood reaches a certain high level. With prediabetes, it hasn t reached this point yet. But it's higher than normal. It is vital to make lifestyle changes to lower your blood sugar, improve your health, and prevent diabetes.       Why worry about prediabetes?  Prediabetes is a condition where the body s cells have trouble using glucose in the blood for energy. As a result, too much glucose stays in the blood. This can affect how your heart and blood vessels work. Without changes in diet and lifestyle, the problem can get worse. Once you have type 2 diabetes, it's ongoing (chronic). It needs to be managed for the rest of your life. Diabetes can harm the body and your health by damaging organs, such as your eyes and kidneys. It makes you more likely to have heart disease. And it can damage nerves and blood vessels.   Who is a risk for prediabetes?  The exact cause of prediabetes is not clear. But certain risk factors make a person more likely to have it. These include:     A family history of type 2 diabetes    Being overweight    Being older than age 45    Have high blood pressure or high cholesterol     Having had gestational diabetes    Not being physically active    Being ,  American, , , , or   Diagnosing prediabetes  Prediabetes may have no symptoms. Or you may have some of the symptoms of diabetes (see below). The diagnosis is made with a blood test. You may have 1 or more of these blood tests:      Fasting glucose  test. Blood is taken and tested after you have fasted (not eaten) for at least 8 hours. A normal test result is 99 milligrams per deciliter (mg/dL) or lower. Prediabetes is 100 mg/dL to 125 mg/dL. Diabetes is 126 mg/dL or higher.    Glucose tolerance test. Your blood sugar is measured before and after you drink a very sugary liquid. A normal test result is 139 milligrams per deciliter (mg/dL) or lower. Prediabetes is 140 mg/dL to 199 mg/dL. Diabetes is 200 mg/dL or higher.    Hemoglobin A1c (HbA1c). Your HbA1c is normal if it is below 5.7%. Prediabetes is 5.7% to 6.4%. Diabetes is 6.5% or higher.   Treating prediabetes  The best way to treat prediabetes is to lose at least 5% to 7% of your current weight and be more physically active by getting at least 150 minutes a week of physical activity (at least 30 minutes daily.) When sitting for long periods of time, get up for short sessions of light activity every 30 minutes. These changes help the body s cells use blood sugar better. Even a small amount of weight loss can help. Work with your healthcare provider to make a plan to eat well and be more active. Keep in mind that small changes can add up. Other changes in your lifestyle (or even taking certain medicines, such as metformin) may make you less likely to develop diabetes. Your provider can talk with you about these. Stopping smoking will decrease your risk of developing diabetes. Don't use e-cigarettes or vaping products. But you may gain some weight if you are not careful.   Ask your healthcare provider for a referral to a lifestyle intervention program. This program will help you get to and stay at a 7% weight loss and increase physical activity.   Follow-up  If not treated, prediabetes can turn into diabetes. This is a serious health condition. Take steps to stop this from happening. Follow the treatment plan you have been given. You may have your blood glucose tested again in about 12 to 18 months.    Diabetes symptoms   Let your healthcare provider know if you have any of the following:    Always feel very tired    Feel very thirsty or hungry much of the time    Have to urinate often    Lose weight for no reason    Feel numbness or tingling in your fingers or toes    Have cuts or bruises that don t seem to heal    Have blurry vision  Tello last reviewed this educational content on 6/1/2019 2000-2021 [employer]. All rights reserved. This information is not intended as a substitute for professional medical care. Always follow your healthcare professional's instructions.

## 2021-08-11 NOTE — PROGRESS NOTES
Assessment & Plan   See after visit summary and result note from studies for helpful information and advice given to patient.    Chest pain, unspecified type  No clear cause of periodic discomfort per exam.  I suspect this is most likely related to occasional anxiety.  I recommended trying hydroxyzine for relief.  If no relief using this, or if the chest discomfort seems directly related to exertion, I recommended further evaluation through cardiologist.  - Comprehensive metabolic panel (BMP + Alb, Alk Phos, ALT, AST, Total. Bili, TP)  - EKG 12-lead complete w/read - Clinics    Epigastric pain  Patient advised to discontinue use of naproxen.  Acetaminophen was recommended, and if this did not relieve any discomfort well enough I recommended ibuprofen which I advised could also cause symptoms similar to naproxen.  We will do ultrasound to further evaluate the epigastric discomfort and elevated liver enzyme.  - Lipase  - Comprehensive metabolic panel (BMP + Alb, Alk Phos, ALT, AST, Total. Bili, TP)  - CBC with platelets  - Helicobacter pylori Antigen Stool  - Helicobacter pylori Antigen Stool  - **Hepatic panel FUTURE 2mo  - US Abdomen Limited    Insomnia, unspecified type  Prescribed hydroxyzine for relief as needed.  - TSH with free T4 reflex  - Comprehensive metabolic panel (BMP + Alb, Alk Phos, ALT, AST, Total. Bili, TP)  - hydrOXYzine (VISTARIL) 50 MG capsule  Dispense: 30 capsule; Refill: 0    Tachycardia  No clear cause per exam.  No treatment recommended for now.  We can consider beta-blocker treatment if it should continue.  - TSH with free T4 reflex  - Comprehensive metabolic panel (BMP + Alb, Alk Phos, ALT, AST, Total. Bili, TP)  - CBC with platelets    Elevated serum glucose    - **A1C FUTURE 3mo    Elevated ALT measurement  Patient encouraged to recheck liver enzymes along with hemoglobin A1c in 3 to 6 months.  - **Hepatic panel FUTURE 2mo  - US Abdomen Limited               BMI:   Estimated body mass  "index is 27.14 kg/m  as calculated from the following:    Height as of this encounter: 1.562 m (5' 1.5\").    Weight as of this encounter: 66.2 kg (146 lb).   Weight management plan: Discussed healthy diet and exercise guidelines    Depression Screening Follow Up    PHQ 8/11/2021   PHQ-9 Total Score 10   Q9: Thoughts of better off dead/self-harm past 2 weeks Not at all             Return in about 1 month (around 9/11/2021) for Follow up.    Jay Venegas DO  [facility] MITA Whelan is a 46 year old who presents for the following health issues     HPI   PHQ2    Concern - Sleeping issues- Not Sleeping at all  Onset: 2 years, getting worse reciently `  Description: Takes a hour+ to go to sleep, waking up often. Very aware of everything, no deep sleep  Intensity: severe  Progression of Symptoms:  worsening  Therapies tried and outcome: Melatonin-Not effective. OTC meds, White noise helps.     Higher than normal Pulse.           Review of Systems   Check in questions and answers reviewed. Patient is seen for chief concern of persistent insomnia.    Patient has not had a regular primary care provider.     She had some labs done in [place] earlier this Summer.  She showed these to me at time of exam.  The included a glucose level and cholesterol panel, and CBC which were normal.    She noted her heart rate was about 120 bpm at work last week, when she felt anxious and had chest pain on/off. She has had occasional stabbing left upper chest pain for \"many years\" not every day.    At exam, she feels tired due to poor sleep.     She is  and monogamous.     No recent shortness of breath.     She does not exercise.     She works as a medical tech.     She has chronic epigastric pain, which is better with taking omeprazole.     She takes naproxen about every other day for relief of back pain.  We discussed how this may be causing some of her epigastric discomfort through gastric " "irritation.    She tried taking clonazapam from [place] supply, which she felt didn't help her sleep.    She does not feel she needs daily treatment for anxiety, with her insomnia being her main concern.    She was agreeable to trying hydroxyzine both to help her sleep, and to help relieve these rare instances of anxiety with related chest discomfort she reported to having.    Patient was agreeable to doing several screening labs to help assess her recent symptoms.        Objective    /70   Pulse 98   Temp 98  F (36.7  C) (Oral)   Resp 16   Ht 1.562 m (5' 1.5\")   Wt 66.2 kg (146 lb)   SpO2 97%   BMI 27.14 kg/m    Body mass index is 27.14 kg/m .  Physical Exam   Vital signs reviewed.  Patient is in no acute appearing distress.  Breathing appears nonlabored.  Patient is alert and oriented ×3.  Patient is very pleasant, making good eye contact and responding with clear fluent speech.  She speaks with a normal word rate in nonpressured speech.    ENT: Ear exam shows bilateral tympanic membranes to be clear without injection, nasal turbinates show no injection or edema, no pharyngeal injection or exudate.    Neck: supple with no adenoapthy, palpable abnormal masses, or thyroid abnormality.    Heart: Heart rate is slightly tachycardic without murmur.    Lungs: Lungs are clear to auscultation with good airflow bilaterally.    Back: No areas of tenderness.    Abdomen soft, there is mild epigastric tenderness, no abnormal masses or organomegally. Normal bowel sounds.    Skin/extremities: Warm and dry, with no ankle edema.    See picture in this visit note of lab results from a recent trip to [place] shown to me by patient on her mobile phone.                Results for orders placed or performed in visit on 08/11/21   TSH with free T4 reflex     Status: Normal   Result Value Ref Range    TSH 1.84 0.40 - 4.00 mU/L   Lipase     Status: Normal   Result Value Ref Range    Lipase 138 73 - 393 U/L   Comprehensive " metabolic panel (BMP + Alb, Alk Phos, ALT, AST, Total. Bili, TP)     Status: Abnormal   Result Value Ref Range    Sodium 139 133 - 144 mmol/L    Potassium 3.9 3.4 - 5.3 mmol/L    Chloride 107 94 - 109 mmol/L    Carbon Dioxide (CO2) 25 20 - 32 mmol/L    Anion Gap 7 3 - 14 mmol/L    Urea Nitrogen 11 7 - 30 mg/dL    Creatinine 0.92 0.52 - 1.04 mg/dL    Calcium 9.2 8.5 - 10.1 mg/dL    Glucose 120 (H) 70 - 99 mg/dL    Alkaline Phosphatase 82 40 - 150 U/L    AST 25 0 - 45 U/L    ALT 58 (H) 0 - 50 U/L    Protein Total 7.5 6.8 - 8.8 g/dL    Albumin 4.1 3.4 - 5.0 g/dL    Bilirubin Total 0.3 0.2 - 1.3 mg/dL    GFR Estimate 75 >60 mL/min/1.73m2   CBC with platelets     Status: Normal   Result Value Ref Range    WBC Count 6.7 4.0 - 11.0 10e3/uL    RBC Count 4.55 3.80 - 5.20 10e6/uL    Hemoglobin 13.4 11.7 - 15.7 g/dL    Hematocrit 39.6 35.0 - 47.0 %    MCV 87 78 - 100 fL    MCH 29.5 26.5 - 33.0 pg    MCHC 33.8 31.5 - 36.5 g/dL    RDW 13.1 10.0 - 15.0 %    Platelet Count 216 150 - 450 10e3/uL   Hepatitis C antibody     Status: Normal   Result Value Ref Range    Hepatitis C Antibody Nonreactive Nonreactive    Narrative    Assay performance characteristics have not been established for newborns, infants, and children.     Lab results not available for review at time of exam.  Please see result note.    I was unable to add a hemoglobin A1c study due to hemolysis of sample.    I reviewed the electrocardiogram with patient by telephone.  I felt it showed normal sinus rhythm at a ventricular rate of 90 bpm with no ischemic changes or conduction abnormalities.  The computer interpretation was atrial flutter, which I did not note on my review.  I advised patient of this.

## 2021-08-12 ENCOUNTER — APPOINTMENT (OUTPATIENT)
Dept: LAB | Facility: CLINIC | Age: 46
End: 2021-08-12

## 2021-08-12 LAB
ALBUMIN SERPL-MCNC: 4.1 G/DL (ref 3.4–5)
ALP SERPL-CCNC: 82 U/L (ref 40–150)
ALT SERPL W P-5'-P-CCNC: 58 U/L (ref 0–50)
ANION GAP SERPL CALCULATED.3IONS-SCNC: 7 MMOL/L (ref 3–14)
AST SERPL W P-5'-P-CCNC: 25 U/L (ref 0–45)
BILIRUB SERPL-MCNC: 0.3 MG/DL (ref 0.2–1.3)
BUN SERPL-MCNC: 11 MG/DL (ref 7–30)
CALCIUM SERPL-MCNC: 9.2 MG/DL (ref 8.5–10.1)
CHLORIDE BLD-SCNC: 107 MMOL/L (ref 94–109)
CO2 SERPL-SCNC: 25 MMOL/L (ref 20–32)
CREAT SERPL-MCNC: 0.92 MG/DL (ref 0.52–1.04)
GFR SERPL CREATININE-BSD FRML MDRD: 75 ML/MIN/1.73M2
GLUCOSE BLD-MCNC: 120 MG/DL (ref 70–99)
LIPASE SERPL-CCNC: 138 U/L (ref 73–393)
POTASSIUM BLD-SCNC: 3.9 MMOL/L (ref 3.4–5.3)
PROT SERPL-MCNC: 7.5 G/DL (ref 6.8–8.8)
SODIUM SERPL-SCNC: 139 MMOL/L (ref 133–144)
TSH SERPL DL<=0.005 MIU/L-ACNC: 1.84 MU/L (ref 0.4–4)

## 2021-08-12 PROCEDURE — 87338 HPYLORI STOOL AG IA: CPT | Performed by: FAMILY MEDICINE

## 2021-08-13 LAB — HCV AB SERPL QL IA: NONREACTIVE

## 2021-08-17 LAB — H PYLORI AG STL QL IA: POSITIVE

## 2021-08-17 RX ORDER — AMOXICILLIN 500 MG/1
1000 CAPSULE ORAL 2 TIMES DAILY
Qty: 56 CAPSULE | Refills: 0 | Status: SHIPPED | OUTPATIENT
Start: 2021-08-17 | End: 2021-08-31

## 2021-08-17 RX ORDER — CLARITHROMYCIN 500 MG
500 TABLET ORAL 2 TIMES DAILY
Qty: 28 TABLET | Refills: 0 | Status: SHIPPED | OUTPATIENT
Start: 2021-08-17 | End: 2021-08-31

## 2021-09-01 ENCOUNTER — LAB (OUTPATIENT)
Dept: LAB | Facility: CLINIC | Age: 46
End: 2021-09-01

## 2021-09-01 DIAGNOSIS — R73.9 ELEVATED SERUM GLUCOSE: ICD-10-CM

## 2021-09-01 DIAGNOSIS — R74.01 ELEVATED ALT MEASUREMENT: ICD-10-CM

## 2021-09-01 DIAGNOSIS — R10.13 EPIGASTRIC PAIN: ICD-10-CM

## 2021-09-01 LAB
ALBUMIN SERPL-MCNC: 4 G/DL (ref 3.4–5)
ALP SERPL-CCNC: 70 U/L (ref 40–150)
ALT SERPL W P-5'-P-CCNC: 46 U/L (ref 0–50)
AST SERPL W P-5'-P-CCNC: 22 U/L (ref 0–45)
BILIRUB DIRECT SERPL-MCNC: 0.1 MG/DL (ref 0–0.2)
BILIRUB SERPL-MCNC: 0.5 MG/DL (ref 0.2–1.3)
HBA1C MFR BLD: 6 % (ref 0–5.6)
PROT SERPL-MCNC: 7.5 G/DL (ref 6.8–8.8)

## 2021-09-01 PROCEDURE — 80076 HEPATIC FUNCTION PANEL: CPT

## 2021-09-01 PROCEDURE — 83036 HEMOGLOBIN GLYCOSYLATED A1C: CPT

## 2021-09-01 PROCEDURE — 36415 COLL VENOUS BLD VENIPUNCTURE: CPT

## 2021-10-11 ENCOUNTER — HEALTH MAINTENANCE LETTER (OUTPATIENT)
Age: 46
End: 2021-10-11

## 2021-10-27 ENCOUNTER — OFFICE VISIT (OUTPATIENT)
Dept: FAMILY MEDICINE | Facility: CLINIC | Age: 46
End: 2021-10-27

## 2021-10-27 VITALS
WEIGHT: 144 LBS | DIASTOLIC BLOOD PRESSURE: 74 MMHG | SYSTOLIC BLOOD PRESSURE: 116 MMHG | HEART RATE: 88 BPM | TEMPERATURE: 98.5 F | OXYGEN SATURATION: 98 % | BODY MASS INDEX: 27.19 KG/M2 | RESPIRATION RATE: 16 BRPM | HEIGHT: 61 IN

## 2021-10-27 DIAGNOSIS — Z20.1 EXPOSURE TO TB: Primary | ICD-10-CM

## 2021-10-27 PROCEDURE — 99213 OFFICE O/P EST LOW 20 MIN: CPT | Performed by: FAMILY MEDICINE

## 2021-10-27 ASSESSMENT — MIFFLIN-ST. JEOR: SCORE: 1235.56

## 2021-10-27 NOTE — PROGRESS NOTES
"  Assessment & Plan     Exposure to TB  Lab result from this exam is reassuring that patient/employee is not ill with tuberculosis.  - Quantiferon TB Gold Plus                   Return If positive TB screening..    Jay Venegas DO  [facility]    Stephanie Whelan is a 46 year old who presents for the following health issues     HPI     Patient had exposure to Tuberculosis 04/2021. She needs a blood draw per work.            Review of Systems   Check in questions and answers reviewed. Patient is seen for chief concern of exposure to patient positive for tuberculosis illness.    Patient states she was exposed to a TB positive patient on 04/27/2021.     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      At time of exam, patient has no acute physical or mental health concerns, no feeling ill at exam. No stated history of cough, shortness of breath, fever, chills, or night sweats.       Objective    /74 (Cuff Size: Adult Regular)   Pulse 88   Temp 98.5  F (36.9  C) (Oral)   Resp 16   Ht 1.549 m (5' 1\")   Wt 65.3 kg (144 lb)   SpO2 98%   BMI 27.21 kg/m    Body mass index is 27.21 kg/m .  Physical Exam   Vital signs reviewed.  Patient is in no acute appearing distress.  Breathing appears nonlabored.  Patient is alert and oriented ×3. Patient is very pleasant, making good eye contact and responding with clear fluent speech.    Heart: Heart rate is regular without murmur.    Lungs: Lungs are clear to auscultation with good airflow bilaterally.    Skin/extremities: Warm and dry, with no lower leg edema.    Recent Results (from the past 168 hour(s))   Quantiferon TB Gold Plus Grey Tube    Specimen: Peripheral Blood   Result Value Ref Range    Quantiferon Nil Tube 0.09 IU/mL   Quantiferon TB Gold Plus Green Tube    Specimen: Peripheral Blood   Result Value Ref Range    Quantiferon TB1 Tube 0.15 IU/mL   Quantiferon TB Gold Plus Yellow Tube    Specimen: Peripheral Blood   Result Value " Ref Range    Quantiferon TB2 Tube 0.16    Quantiferon TB Gold Plus Purple Tube    Specimen: Peripheral Blood   Result Value Ref Range    Quantiferon Mitogen 10.00 IU/mL   Quantiferon TB Gold Plus    Specimen: Peripheral Blood   Result Value Ref Range    Quantiferon-TB Gold Plus Negative Negative    TB1 Ag minus Nil Value 0.06 IU/mL    TB2 Ag minus Nil Value 0.07 IU/mL    Mitogen minus Nil Result 9.91 IU/mL    Nil Result 0.09 IU/mL   Lab results not available for review at time of exam.  Please see result note.

## 2021-10-27 NOTE — PATIENT INSTRUCTIONS
Patient Education     Tuberculosis Testing    Tuberculosis (TB) is a bacterial disease that spreads through the air. A person with TB of the throat or lungs who coughs, speaks, or sings can unknowingly spread the bacteria into the air. Uninfected people nearby can breathe in the TB bacteria and be infected.  TB can cause serious health problems. To protect your health, get tested.  Important  If you have active TB disease or simply test positive for TB infection, you must see a healthcare provider for an exam and treatment.   Who should be tested?  Anyone can be exposed to TB. But healthcare providers, homeless people, and people coming from countries with high TB rates are more likely to be exposed to it. Older adults and people with HIV and AIDS are less able to fight off infections. They are also more likely to get TB. If you re at risk for exposure, get tested regularly.  TB tests    Skin test. The TB skin test tells you if the TB bacteria are in your body. A small amount of solution is put under the skin with a needle. This is done to see if a reaction occurs, such as a hard, red bump.    Blood test. A small amount of blood is drawn and sent to a lab for testing.    Other tests. If you are being evaluated for active TB, a sample may be taken of the mucus that comes up when you cough (sputum). Or some other tissue or body fluid sample may be taken. It is then tested for TB.  Getting your TB test results  After the placement of a TB skin test, 2 to 3 days later you ll need to return to the office to get the test read. Be sure to keep this appointment. In some cases, a second test may be done to confirm results. If you have a blood test, the results are often available in a week. Samples of sputum and other body tissues and fluids can take up to 6 weeks for final results.  What do the test results mean    Negative results. These mean you likely don t have the TB bacteria in your body. But in people with some  chronic illnesses, the TB tests can be negative even if there is TB in the body.    Positive results. These mean that you may have been infected with the TB bacteria. This doesn t necessarily mean you have active TB disease. You will need more tests, such as chest X-rays, to find out if you have active TB disease. If you don't have active TB, then you are said to have latent TB infection.   [employer] last reviewed this educational content on 6/1/2019 2000-2021 [employer]. All rights reserved. This information is not intended as a substitute for professional medical care. Always follow your healthcare professional's instructions.

## 2021-10-28 ENCOUNTER — LAB (OUTPATIENT)
Dept: LAB | Facility: CLINIC | Age: 46
End: 2021-10-28

## 2021-10-28 DIAGNOSIS — Z20.1 EXPOSURE TO TB: ICD-10-CM

## 2021-10-28 PROCEDURE — 36415 COLL VENOUS BLD VENIPUNCTURE: CPT

## 2021-10-28 PROCEDURE — 86481 TB AG RESPONSE T-CELL SUSP: CPT

## 2021-10-29 LAB
GAMMA INTERFERON BACKGROUND BLD IA-ACNC: 0.09 IU/ML
M TB IFN-G BLD-IMP: NEGATIVE
M TB IFN-G CD4+ BCKGRND COR BLD-ACNC: 9.91 IU/ML
MITOGEN IGNF BCKGRD COR BLD-ACNC: 0.06 IU/ML
MITOGEN IGNF BCKGRD COR BLD-ACNC: 0.07 IU/ML
QUANTIFERON MITOGEN: 10 IU/ML
QUANTIFERON NIL TUBE: 0.09 IU/ML
QUANTIFERON TB1 TUBE: 0.15 IU/ML
QUANTIFERON TB2 TUBE: 0.16

## 2022-09-24 ENCOUNTER — HEALTH MAINTENANCE LETTER (OUTPATIENT)
Age: 47
End: 2022-09-24

## 2023-01-29 ENCOUNTER — HEALTH MAINTENANCE LETTER (OUTPATIENT)
Age: 48
End: 2023-01-29

## 2024-02-25 ENCOUNTER — HEALTH MAINTENANCE LETTER (OUTPATIENT)
Age: 49
End: 2024-02-25

## 2025-03-05 ENCOUNTER — TRANSFERRED RECORDS (OUTPATIENT)
Dept: MULTI SPECIALTY CLINIC | Facility: CLINIC | Age: 50
End: 2025-03-05

## 2025-03-05 LAB — PAP SMEAR - HIM PATIENT REPORTED: NORMAL

## 2025-06-28 ENCOUNTER — HEALTH MAINTENANCE LETTER (OUTPATIENT)
Age: 50
End: 2025-06-28

## 2025-08-25 ENCOUNTER — OFFICE VISIT (OUTPATIENT)
Dept: FAMILY MEDICINE | Facility: CLINIC | Age: 50
End: 2025-08-25
Payer: COMMERCIAL

## 2025-08-25 VITALS
HEART RATE: 103 BPM | SYSTOLIC BLOOD PRESSURE: 149 MMHG | RESPIRATION RATE: 16 BRPM | TEMPERATURE: 98.4 F | WEIGHT: 147.9 LBS | HEIGHT: 61 IN | OXYGEN SATURATION: 97 % | DIASTOLIC BLOOD PRESSURE: 90 MMHG | BODY MASS INDEX: 27.93 KG/M2

## 2025-08-25 DIAGNOSIS — M25.511 ACUTE PAIN OF RIGHT SHOULDER: Primary | ICD-10-CM

## 2025-08-25 PROCEDURE — G2211 COMPLEX E/M VISIT ADD ON: HCPCS

## 2025-08-25 PROCEDURE — 3077F SYST BP >= 140 MM HG: CPT

## 2025-08-25 PROCEDURE — 3079F DIAST BP 80-89 MM HG: CPT

## 2025-08-25 PROCEDURE — 99203 OFFICE O/P NEW LOW 30 MIN: CPT

## 2025-08-25 RX ORDER — CYCLOBENZAPRINE HCL 10 MG
10 TABLET ORAL 3 TIMES DAILY PRN
Qty: 30 TABLET | Refills: 0 | Status: SHIPPED | OUTPATIENT
Start: 2025-08-25

## 2025-08-25 ASSESSMENT — ANXIETY QUESTIONNAIRES
2. NOT BEING ABLE TO STOP OR CONTROL WORRYING: NEARLY EVERY DAY
7. FEELING AFRAID AS IF SOMETHING AWFUL MIGHT HAPPEN: NEARLY EVERY DAY
4. TROUBLE RELAXING: NEARLY EVERY DAY
GAD7 TOTAL SCORE: 15
GAD7 TOTAL SCORE: 15
7. FEELING AFRAID AS IF SOMETHING AWFUL MIGHT HAPPEN: NEARLY EVERY DAY
8. IF YOU CHECKED OFF ANY PROBLEMS, HOW DIFFICULT HAVE THESE MADE IT FOR YOU TO DO YOUR WORK, TAKE CARE OF THINGS AT HOME, OR GET ALONG WITH OTHER PEOPLE?: SOMEWHAT DIFFICULT
IF YOU CHECKED OFF ANY PROBLEMS ON THIS QUESTIONNAIRE, HOW DIFFICULT HAVE THESE PROBLEMS MADE IT FOR YOU TO DO YOUR WORK, TAKE CARE OF THINGS AT HOME, OR GET ALONG WITH OTHER PEOPLE: SOMEWHAT DIFFICULT
3. WORRYING TOO MUCH ABOUT DIFFERENT THINGS: NEARLY EVERY DAY
6. BECOMING EASILY ANNOYED OR IRRITABLE: SEVERAL DAYS
GAD7 TOTAL SCORE: 15
1. FEELING NERVOUS, ANXIOUS, OR ON EDGE: SEVERAL DAYS
5. BEING SO RESTLESS THAT IT IS HARD TO SIT STILL: SEVERAL DAYS

## 2025-08-25 ASSESSMENT — PATIENT HEALTH QUESTIONNAIRE - PHQ9
SUM OF ALL RESPONSES TO PHQ QUESTIONS 1-9: 13
SUM OF ALL RESPONSES TO PHQ QUESTIONS 1-9: 13
10. IF YOU CHECKED OFF ANY PROBLEMS, HOW DIFFICULT HAVE THESE PROBLEMS MADE IT FOR YOU TO DO YOUR WORK, TAKE CARE OF THINGS AT HOME, OR GET ALONG WITH OTHER PEOPLE: SOMEWHAT DIFFICULT

## 2025-08-28 ASSESSMENT — PATIENT HEALTH QUESTIONNAIRE - PHQ9: SUM OF ALL RESPONSES TO PHQ QUESTIONS 1-9: 17
